# Patient Record
Sex: FEMALE | Race: WHITE | NOT HISPANIC OR LATINO | URBAN - NONMETROPOLITAN AREA
[De-identification: names, ages, dates, MRNs, and addresses within clinical notes are randomized per-mention and may not be internally consistent; named-entity substitution may affect disease eponyms.]

---

## 2020-06-09 ENCOUNTER — OFFICE VISIT (OUTPATIENT)
Dept: URBAN - NONMETROPOLITAN AREA CLINIC 2 | Facility: CLINIC | Age: 60
End: 2020-06-09

## 2021-08-05 ENCOUNTER — OUT OF OFFICE VISIT (OUTPATIENT)
Dept: URBAN - METROPOLITAN AREA MEDICAL CENTER 1 | Facility: MEDICAL CENTER | Age: 61
End: 2021-08-05
Payer: MEDICARE

## 2021-08-05 DIAGNOSIS — D50.9 ANEMIA, IRON DEFICIENCY: ICD-10-CM

## 2021-08-05 DIAGNOSIS — Z86.010 H/O ADENOMATOUS POLYP OF COLON: ICD-10-CM

## 2021-08-05 DIAGNOSIS — K21.9 ACID REFLUX: ICD-10-CM

## 2021-08-05 PROCEDURE — 99254 IP/OBS CNSLTJ NEW/EST MOD 60: CPT | Performed by: INTERNAL MEDICINE

## 2021-08-06 ENCOUNTER — OUT OF OFFICE VISIT (OUTPATIENT)
Dept: URBAN - METROPOLITAN AREA MEDICAL CENTER 1 | Facility: MEDICAL CENTER | Age: 61
End: 2021-08-06
Payer: MEDICARE

## 2021-08-06 DIAGNOSIS — D50.9 ANEMIA, IRON DEFICIENCY: ICD-10-CM

## 2021-08-06 DIAGNOSIS — K29.60 ADENOPAPILLOMATOSIS GASTRICA: ICD-10-CM

## 2021-08-06 DIAGNOSIS — K22.8 COLUMNAR-LINED ESOPHAGUS: ICD-10-CM

## 2021-08-06 PROCEDURE — 43239 EGD BIOPSY SINGLE/MULTIPLE: CPT | Performed by: INTERNAL MEDICINE

## 2021-11-09 ENCOUNTER — WEB ENCOUNTER (OUTPATIENT)
Dept: URBAN - NONMETROPOLITAN AREA CLINIC 2 | Facility: CLINIC | Age: 61
End: 2021-11-09

## 2021-11-09 ENCOUNTER — OFFICE VISIT (OUTPATIENT)
Dept: URBAN - NONMETROPOLITAN AREA CLINIC 2 | Facility: CLINIC | Age: 61
End: 2021-11-09
Payer: MEDICARE

## 2021-11-09 VITALS
HEIGHT: 67 IN | SYSTOLIC BLOOD PRESSURE: 136 MMHG | DIASTOLIC BLOOD PRESSURE: 87 MMHG | BODY MASS INDEX: 31.08 KG/M2 | WEIGHT: 198 LBS | TEMPERATURE: 96.7 F | HEART RATE: 108 BPM

## 2021-11-09 DIAGNOSIS — D50.8 OTHER IRON DEFICIENCY ANEMIAS: ICD-10-CM

## 2021-11-09 DIAGNOSIS — K25.3 ACUTE CAMERON ULCER: ICD-10-CM

## 2021-11-09 DIAGNOSIS — Z86.010 PERSONAL HISTORY OF COLONIC POLYPS: ICD-10-CM

## 2021-11-09 DIAGNOSIS — K57.30 ACQUIRED DIVERTICULOSIS OF COLON: ICD-10-CM

## 2021-11-09 DIAGNOSIS — R13.19 DYSPHAGIA: ICD-10-CM

## 2021-11-09 DIAGNOSIS — K21.00 CHRONIC REFLUX ESOPHAGITIS: ICD-10-CM

## 2021-11-09 DIAGNOSIS — K44.9 HIATAL HERNIA: ICD-10-CM

## 2021-11-09 PROBLEM — 397881000: Status: ACTIVE | Noted: 2021-11-09

## 2021-11-09 PROCEDURE — 99214 OFFICE O/P EST MOD 30 MIN: CPT | Performed by: NURSE PRACTITIONER

## 2021-11-09 RX ORDER — PANTOPRAZOLE SODIUM 40 MG/1
1 TABLET TABLET, DELAYED RELEASE ORAL
Qty: 180 TABLET | Refills: 3 | OUTPATIENT
Start: 2021-11-09

## 2021-11-09 RX ORDER — DULOXETINE 20 MG/1
TAKE 1 CAPSULE BY ORAL ROUTE 2 TIMES A DAY CAPSULE, DELAYED RELEASE PELLETS ORAL 2
Qty: 0 | Refills: 0 | Status: ACTIVE | COMMUNITY
Start: 1900-01-01

## 2021-11-09 RX ORDER — TURMERIC 400 MG
CAPSULE ORAL
Qty: 0 | Refills: 0 | Status: ACTIVE | COMMUNITY
Start: 1900-01-01

## 2021-11-09 RX ORDER — PANTOPRAZOLE SODIUM 40 MG/1
TAKE 1 TABLET BY MOUTH TWICE A DAY TABLET, DELAYED RELEASE ORAL
Qty: 180 | Refills: 4 | Status: ACTIVE | COMMUNITY
Start: 2020-05-11

## 2021-11-09 NOTE — HPI-TODAY'S VISIT:
Mrs. Gar is a 61-year-old female who presents for [facility] follow-up.  She was admitted in August per her primary care physician with symptomatic anemia and a hemoglobin of 5.7 she underwent upper endoscopy by Dr. Gordon with a large hiatal hernia patchy erythema of the lower cervical third of the esophagus along with Glenn's erosions.  This was likely the etiology of her bleeding.  She has been off PPI regularly.  She took Protonix 40 mg twice daily on discharge.  Her hemoglobin was 7.1 on discharge.  We do not have Dr. Wells off his recent records but she states that she had almost normalized with a recent mild drop.  She denies any melena or rectal bleeding.  Her last colonoscopy was in 2019 with a benign polyp and left-sided diverticular disease.  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  She does agree to restart twice daily PPI and to repeat her hemoglobin in 6 to 12 weeks.  She agrees to consider endoscopic evaluation if her hemoglobin does not improve back on PPI.  MB

## 2022-01-24 ENCOUNTER — LAB OUTSIDE AN ENCOUNTER (OUTPATIENT)
Dept: URBAN - NONMETROPOLITAN AREA CLINIC 2 | Facility: CLINIC | Age: 62
End: 2022-01-24

## 2022-02-02 ENCOUNTER — OFFICE VISIT (OUTPATIENT)
Dept: URBAN - METROPOLITAN AREA TELEHEALTH 2 | Facility: TELEHEALTH | Age: 62
End: 2022-02-02

## 2023-06-23 ENCOUNTER — WEB ENCOUNTER (OUTPATIENT)
Dept: URBAN - NONMETROPOLITAN AREA CLINIC 2 | Facility: CLINIC | Age: 63
End: 2023-06-23

## 2023-06-23 ENCOUNTER — LAB OUTSIDE AN ENCOUNTER (OUTPATIENT)
Dept: URBAN - NONMETROPOLITAN AREA CLINIC 2 | Facility: CLINIC | Age: 63
End: 2023-06-23

## 2023-06-23 ENCOUNTER — OFFICE VISIT (OUTPATIENT)
Dept: URBAN - NONMETROPOLITAN AREA CLINIC 2 | Facility: CLINIC | Age: 63
End: 2023-06-23
Payer: MEDICARE

## 2023-06-23 VITALS
TEMPERATURE: 98.4 F | BODY MASS INDEX: 35.31 KG/M2 | WEIGHT: 225 LBS | SYSTOLIC BLOOD PRESSURE: 169 MMHG | HEIGHT: 67 IN | HEART RATE: 73 BPM | DIASTOLIC BLOOD PRESSURE: 90 MMHG

## 2023-06-23 DIAGNOSIS — K57.90 DIVERTICULOSIS: ICD-10-CM

## 2023-06-23 DIAGNOSIS — D50.0 IRON DEFICIENCY ANEMIA DUE TO CHRONIC BLOOD LOSS: ICD-10-CM

## 2023-06-23 DIAGNOSIS — R13.10 DYSPHAGIA: ICD-10-CM

## 2023-06-23 DIAGNOSIS — K21.9 CHRONIC GERD: ICD-10-CM

## 2023-06-23 PROBLEM — 724556004: Status: ACTIVE | Noted: 2021-11-09

## 2023-06-23 PROCEDURE — 99214 OFFICE O/P EST MOD 30 MIN: CPT | Performed by: INTERNAL MEDICINE

## 2023-06-23 RX ORDER — TURMERIC 400 MG
CAPSULE ORAL
Qty: 0 | Refills: 0 | Status: ACTIVE | COMMUNITY
Start: 1900-01-01

## 2023-06-23 RX ORDER — DULOXETINE 20 MG/1
TAKE 1 CAPSULE BY ORAL ROUTE 2 TIMES A DAY CAPSULE, DELAYED RELEASE PELLETS ORAL 2
Qty: 0 | Refills: 0 | Status: ACTIVE | COMMUNITY
Start: 1900-01-01

## 2023-06-23 RX ORDER — PANTOPRAZOLE SODIUM 40 MG/1
TAKE 1 TABLET BY MOUTH TWICE A DAY TABLET, DELAYED RELEASE ORAL
Qty: 180 | Refills: 4 | Status: ACTIVE | COMMUNITY
Start: 2020-05-11

## 2023-06-23 RX ORDER — PANTOPRAZOLE SODIUM 40 MG/1
1 TABLET TABLET, DELAYED RELEASE ORAL
Qty: 180 TABLET | Refills: 3 | Status: ACTIVE | COMMUNITY
Start: 2021-11-09

## 2023-06-23 NOTE — HPI-TODAY'S VISIT:
Mrs. Gar is a 61-year-old female who presents for [facility] follow-up.  She was admitted in August per her primary care physician with symptomatic anemia and a hemoglobin of 5.7 she underwent upper endoscopy by Dr. Gordon with a large hiatal hernia patchy erythema of the lower cervical third of the esophagus along with Glnen's erosions.  This was likely the etiology of her bleeding.  She has been off PPI regularly.  She took Protonix 40 mg twice daily on discharge.  Her hemoglobin was 7.1 on discharge.  We do not have Dr. Wells off his recent records but she states that she had almost normalized with a recent mild drop.  She denies any melena or rectal bleeding.  Her last colonoscopy was in 2019 with a benign polyp and left-sided diverticular disease.  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  She does agree to restart twice daily PPI and to repeat her hemoglobin in 6 to 12 weeks.  She agrees to consider endoscopic evaluation if her hemoglobin does not improve back on PPI.  MB 6/23/2023 Misty presents for follow-up of reflux, dysphagia, iron deficiency anemia likely secondary to GI bleeding secondary to Glenn's erosions, and personal history of colon polyps.  She is on pantoprazole 40 mg twice daily.  Her reflux is stable but she is having increased dysphagia to solids.  Her last EGD was in 2021 with a large hiatal hernia and Glenn's erosions status post dilation in the past with improvement.  She does feel like she would benefit from a repeat EGD with dilation.  Her last colonoscopy was in 2019 with 1 tubular adenoma.  She is due for repeat next year.  Her iron deficiency has resolved.  This was likely secondary to chronic GI blood loss in the setting of a large hiatal hernia and Glenn's erosions.  She has been dismissed from hematology.  Her H&H has been stable with Dr. Galindo.  Today she is doing well however she is having increased dysphagia and she agrees to repeat EGD with dilation.  MB

## 2023-09-25 ENCOUNTER — OUT OF OFFICE VISIT (OUTPATIENT)
Dept: URBAN - NONMETROPOLITAN AREA SURGERY CENTER 1 | Facility: SURGERY CENTER | Age: 63
End: 2023-09-25
Payer: MEDICARE

## 2023-09-25 DIAGNOSIS — K29.60 ADENOPAPILLOMATOSIS GASTRICA: ICD-10-CM

## 2023-09-25 DIAGNOSIS — K44.9 HIATAL HERNIA: ICD-10-CM

## 2023-09-25 DIAGNOSIS — K22.2 SCHATZKI'S RING: ICD-10-CM

## 2023-09-25 DIAGNOSIS — K22.2 ACQUIRED ESOPHAGEAL RING: ICD-10-CM

## 2023-09-25 DIAGNOSIS — K20.80 ESOPHAGITIS DISSECANS SUPERFICIALIS: ICD-10-CM

## 2023-09-25 DIAGNOSIS — K31.9 ERYTHEMA OF GASTRIC ANTRUM: ICD-10-CM

## 2023-09-25 PROCEDURE — G8907 PT DOC NO EVENTS ON DISCHARG: HCPCS | Performed by: INTERNAL MEDICINE

## 2023-09-25 PROCEDURE — 43239 EGD BIOPSY SINGLE/MULTIPLE: CPT | Performed by: INTERNAL MEDICINE

## 2023-09-25 PROCEDURE — 00731 ANES UPR GI NDSC PX NOS: CPT | Performed by: NURSE ANESTHETIST, CERTIFIED REGISTERED

## 2023-09-25 PROCEDURE — 43249 ESOPH EGD DILATION <30 MM: CPT | Performed by: INTERNAL MEDICINE

## 2023-11-27 ENCOUNTER — DASHBOARD ENCOUNTERS (OUTPATIENT)
Age: 63
End: 2023-11-27

## 2023-11-27 ENCOUNTER — CLAIMS CREATED FROM THE CLAIM WINDOW (OUTPATIENT)
Dept: URBAN - NONMETROPOLITAN AREA CLINIC 2 | Facility: CLINIC | Age: 63
End: 2023-11-27
Payer: MEDICARE

## 2023-11-27 VITALS
WEIGHT: 220 LBS | DIASTOLIC BLOOD PRESSURE: 84 MMHG | SYSTOLIC BLOOD PRESSURE: 123 MMHG | HEART RATE: 88 BPM | TEMPERATURE: 98.7 F | HEIGHT: 67 IN | BODY MASS INDEX: 34.53 KG/M2

## 2023-11-27 DIAGNOSIS — R13.10 DYSPHAGIA: ICD-10-CM

## 2023-11-27 DIAGNOSIS — K25.3 ACUTE CAMERON ULCER: ICD-10-CM

## 2023-11-27 DIAGNOSIS — K22.2 SCHATZKI'S RING: ICD-10-CM

## 2023-11-27 DIAGNOSIS — D50.0 IRON DEFICIENCY ANEMIA DUE TO CHRONIC BLOOD LOSS: ICD-10-CM

## 2023-11-27 DIAGNOSIS — R13.19 DYSPHAGIA: ICD-10-CM

## 2023-11-27 DIAGNOSIS — K21.9 CHRONIC GERD: ICD-10-CM

## 2023-11-27 DIAGNOSIS — K57.90 DIVERTICULOSIS: ICD-10-CM

## 2023-11-27 DIAGNOSIS — Z86.010 PERSONAL HISTORY OF COLONIC POLYPS: ICD-10-CM

## 2023-11-27 DIAGNOSIS — K44.9 HIATAL HERNIA: ICD-10-CM

## 2023-11-27 PROBLEM — 235595009: Status: ACTIVE | Noted: 2023-06-23

## 2023-11-27 PROBLEM — 397825006: Status: ACTIVE | Noted: 2021-11-09

## 2023-11-27 PROCEDURE — 99214 OFFICE O/P EST MOD 30 MIN: CPT | Performed by: NURSE PRACTITIONER

## 2023-11-27 RX ORDER — DULOXETINE 30 MG/1
1 CAPSULE CAPSULE, DELAYED RELEASE PELLETS ORAL 2
Refills: 0 | Status: ACTIVE | COMMUNITY
Start: 1900-01-01

## 2023-11-27 RX ORDER — PANTOPRAZOLE SODIUM 40 MG/1
TAKE 1 TABLET BY MOUTH TWICE A DAY TABLET, DELAYED RELEASE ORAL
Qty: 180 | Refills: 4 | Status: ACTIVE | COMMUNITY
Start: 2020-05-11

## 2023-11-27 RX ORDER — TURMERIC 400 MG
CAPSULE ORAL
Qty: 0 | Refills: 0 | Status: ON HOLD | COMMUNITY
Start: 1900-01-01

## 2023-11-27 RX ORDER — ATORVASTATIN CALCIUM 20 MG/1
1 TABLET TABLET, FILM COATED ORAL ONCE A DAY
Status: ACTIVE | COMMUNITY

## 2023-11-27 RX ORDER — PANTOPRAZOLE SODIUM 40 MG/1
1 TABLET TABLET, DELAYED RELEASE ORAL
Qty: 180 TABLET | Refills: 3 | Status: ON HOLD | COMMUNITY
Start: 2021-11-09

## 2023-11-27 NOTE — PHYSICAL EXAM PSYCH:
normal mood with appropriate affect , speech clear , normal mood with appropriate affect , speech clear

## 2023-11-27 NOTE — PHYSICAL EXAM CHEST:
breathing is unlabored without accessory muscle use. , normal breath sounds. , breathing is unlabored without accessory muscle use. , normal breath sounds.

## 2023-11-27 NOTE — PHYSICAL EXAM GASTROINTESTINAL
Abdomen , soft, nontender, nondistended , normal bowel sounds , Abdomen , soft, nontender, nondistended , normal bowel sounds

## 2023-11-27 NOTE — PHYSICAL EXAM NEUROLOGIC:
oriented to person, place and time ,  , cranial nerves 2-12 grossly intact , oriented to person, place and time ,  , cranial nerves 2-12 grossly intact

## 2023-11-27 NOTE — PHYSICAL EXAM CARDIOVASCULAR:
regular rate and rhythm , no murmurs, rubs, gallops , regular rate and rhythm , no murmurs, rubs, gallops

## 2023-11-27 NOTE — HPI-TODAY'S VISIT:
Mrs. Gar is a 61-year-old female who presents for [facility] follow-up.  She was admitted in August per her primary care physician with symptomatic anemia and a hemoglobin of 5.7 she underwent upper endoscopy by Dr. Gordon with a large hiatal hernia patchy erythema of the lower cervical third of the esophagus along with Glenn's erosions.  This was likely the etiology of her bleeding.  She has been off PPI regularly.  She took Protonix 40 mg twice daily on discharge.  Her hemoglobin was 7.1 on discharge.  We do not have Dr. Wells off his recent records but she states that she had almost normalized with a recent mild drop.  She denies any melena or rectal bleeding.  Her last colonoscopy was in 2019 with a benign polyp and left-sided diverticular disease.  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  She does agree to restart twice daily PPI and to repeat her hemoglobin in 6 to 12 weeks.  She agrees to consider endoscopic evaluation if her hemoglobin does not improve back on PPI.  MB 6/23/2023 Misty presents for follow-up of reflux, dysphagia, iron deficiency anemia likely secondary to GI bleeding secondary to Glenn's erosions, and personal history of colon polyps.  She is on pantoprazole 40 mg twice daily.  Her reflux is stable but she is having increased dysphagia to solids.  Her last EGD was in 2021 with a large hiatal hernia and Glenn's erosions status post dilation in the past with improvement.  She does feel like she would benefit from a repeat EGD with dilation.  Her last colonoscopy was in 2019 with 1 tubular adenoma.  She is due for repeat next year.  Her iron deficiency has resolved.  This was likely secondary to chronic GI blood loss in the setting of a large hiatal hernia and Glenn's erosions.  She has been dismissed from hematology.  Her H&H has been stable with Dr. Galindo.  Today she is doing well however she is having increased dysphagia and she agrees to repeat EGD with dilation.  MB 11/27/2023 Mrs. Gar presents for follow-up of dysphagia.  She is doing much better status post dilation of her Schatzki's ring to 18 mm.  She still has occasional dysphagia to solids.  She agrees to wean her PPI to 20 mg twice daily.  She is due for collected cancer screening which is scheduled in January.  Today overall she is doing much better. MB

## 2023-12-22 ENCOUNTER — TELEPHONE ENCOUNTER (OUTPATIENT)
Dept: URBAN - NONMETROPOLITAN AREA CLINIC 2 | Facility: CLINIC | Age: 63
End: 2023-12-22

## 2024-02-28 ENCOUNTER — DAC (OUTPATIENT)
Dept: URBAN - NONMETROPOLITAN AREA SURGERY CENTER 1 | Facility: SURGERY CENTER | Age: 64
End: 2024-02-28

## 2024-05-28 ENCOUNTER — CLAIMS CREATED FROM THE CLAIM WINDOW (OUTPATIENT)
Dept: URBAN - NONMETROPOLITAN AREA CLINIC 2 | Facility: CLINIC | Age: 64
End: 2024-05-28

## 2024-05-28 ENCOUNTER — OFFICE VISIT (OUTPATIENT)
Dept: URBAN - NONMETROPOLITAN AREA CLINIC 2 | Facility: CLINIC | Age: 64
End: 2024-05-28
Payer: MEDICARE

## 2024-05-28 VITALS
HEIGHT: 67 IN | BODY MASS INDEX: 34.53 KG/M2 | HEART RATE: 96 BPM | WEIGHT: 220 LBS | DIASTOLIC BLOOD PRESSURE: 73 MMHG | SYSTOLIC BLOOD PRESSURE: 108 MMHG

## 2024-05-28 DIAGNOSIS — R53.83 FATIGUE, UNSPECIFIED TYPE: ICD-10-CM

## 2024-05-28 DIAGNOSIS — K21.9 CHRONIC GERD: ICD-10-CM

## 2024-05-28 DIAGNOSIS — K44.9 HIATAL HERNIA: ICD-10-CM

## 2024-05-28 DIAGNOSIS — D50.0 IRON DEFICIENCY ANEMIA DUE TO CHRONIC BLOOD LOSS: ICD-10-CM

## 2024-05-28 PROBLEM — 84229001: Status: ACTIVE | Noted: 2024-05-28

## 2024-05-28 PROCEDURE — 99214 OFFICE O/P EST MOD 30 MIN: CPT | Performed by: NURSE PRACTITIONER

## 2024-05-28 RX ORDER — ATORVASTATIN CALCIUM 20 MG/1
1 TABLET TABLET, FILM COATED ORAL ONCE A DAY
Status: ACTIVE | COMMUNITY

## 2024-05-28 RX ORDER — DULOXETINE 30 MG/1
1 CAPSULE CAPSULE, DELAYED RELEASE PELLETS ORAL 2
Refills: 0 | Status: ACTIVE | COMMUNITY
Start: 1900-01-01

## 2024-05-28 RX ORDER — PANTOPRAZOLE SODIUM 40 MG/1
TAKE 1 TABLET BY MOUTH TWICE A DAY TABLET, DELAYED RELEASE ORAL
Qty: 180 | Refills: 4 | Status: ACTIVE | COMMUNITY
Start: 2020-05-11

## 2024-05-28 RX ORDER — TURMERIC 400 MG
CAPSULE ORAL
Qty: 0 | Refills: 0 | Status: ON HOLD | COMMUNITY
Start: 1900-01-01

## 2024-05-29 ENCOUNTER — LAB OUTSIDE AN ENCOUNTER (OUTPATIENT)
Dept: URBAN - NONMETROPOLITAN AREA CLINIC 2 | Facility: CLINIC | Age: 64
End: 2024-05-29

## 2024-05-29 ENCOUNTER — TELEPHONE ENCOUNTER (OUTPATIENT)
Dept: URBAN - NONMETROPOLITAN AREA CLINIC 2 | Facility: CLINIC | Age: 64
End: 2024-05-29

## 2024-05-29 ENCOUNTER — TELEPHONE ENCOUNTER (OUTPATIENT)
Dept: URBAN - METROPOLITAN AREA CLINIC 63 | Facility: CLINIC | Age: 64
End: 2024-05-29

## 2024-05-29 LAB
A/G RATIO: 1.3
ABSOLUTE BASOPHILS: 117
ABSOLUTE EOSINOPHILS: 18
ABSOLUTE LYMPHOCYTES: 1548
ABSOLUTE MONOCYTES: 972
ABSOLUTE NEUTROPHILS: 6345
ALBUMIN: 4.3
ALKALINE PHOSPHATASE: 74
ALT (SGPT): 8
AST (SGOT): 14
BASOPHILS: 1.3
BILIRUBIN, TOTAL: 0.6
BUN/CREATININE RATIO: (no result)
BUN: 19
CALCIUM: 9.3
CARBON DIOXIDE, TOTAL: 21
CHLORIDE: 101
CREATININE: 1.05
EGFR: 59
EOSINOPHILS: 0.2
FERRITIN, SERUM: 3
FOLATE, SERUM: 5.6
GLOBULIN, TOTAL: 3.2
GLUCOSE: 59
HEMATOCRIT: 27.3
HEMOGLOBIN: 7.3
IRON BIND.CAP.(TIBC): 443
IRON SATURATION: 4
IRON: 18
LYMPHOCYTES: 17.2
MCH: 20.3
MCHC: 26.7
MCV: 76
MONOCYTES: 10.8
MPV: 11
NEUTROPHILS: 70.5
PLATELET COUNT: 376
POTASSIUM: 5
PROTEIN, TOTAL: 7.5
RDW: 16.6
RED BLOOD CELL COUNT: 3.59
SODIUM: 138
TSH W/REFLEX TO FT4: 2.83
VITAMIN B12: 316
WHITE BLOOD CELL COUNT: 9

## 2024-05-29 RX ORDER — SUCRALFATE 1 G/1
1 TABLET ON AN EMPTY STOMACH TABLET ORAL TWICE A DAY
Qty: 180 | OUTPATIENT
Start: 2024-05-29 | End: 2024-08-27

## 2024-05-29 RX ORDER — PANTOPRAZOLE SODIUM 40 MG/1
1 TABLET TABLET, DELAYED RELEASE ORAL TWICE DAILY
Qty: 180 TABLET | Refills: 3
Start: 2020-05-11

## 2024-06-11 ENCOUNTER — CLAIMS CREATED FROM THE CLAIM WINDOW (OUTPATIENT)
Dept: URBAN - METROPOLITAN AREA CLINIC 4 | Facility: CLINIC | Age: 64
End: 2024-06-11
Payer: MEDICARE

## 2024-06-11 ENCOUNTER — OFFICE VISIT (OUTPATIENT)
Dept: URBAN - NONMETROPOLITAN AREA SURGERY CENTER 1 | Facility: SURGERY CENTER | Age: 64
End: 2024-06-11
Payer: MEDICARE

## 2024-06-11 DIAGNOSIS — K22.89 OTHER SPECIFIED DISEASE OF ESOPHAGUS: ICD-10-CM

## 2024-06-11 DIAGNOSIS — K44.9 HIATAL HERNIA: ICD-10-CM

## 2024-06-11 DIAGNOSIS — K31.89 OTHER DISEASES OF STOMACH AND DUODENUM: ICD-10-CM

## 2024-06-11 DIAGNOSIS — D50.9 ANEMIA: ICD-10-CM

## 2024-06-11 DIAGNOSIS — D50.9 IRON DEFICIENCY ANEMIA: ICD-10-CM

## 2024-06-11 DIAGNOSIS — K29.60 OTHER GASTRITIS WITHOUT BLEEDING: ICD-10-CM

## 2024-06-11 DIAGNOSIS — K29.70 GASTRITIS, UNSPECIFIED, WITHOUT BLEEDING: ICD-10-CM

## 2024-06-11 DIAGNOSIS — K20.90 ESOPHAGITIS: ICD-10-CM

## 2024-06-11 PROCEDURE — 43239 EGD BIOPSY SINGLE/MULTIPLE: CPT | Performed by: INTERNAL MEDICINE

## 2024-06-11 PROCEDURE — 43239 EGD BIOPSY SINGLE/MULTIPLE: CPT | Performed by: CLINIC/CENTER

## 2024-06-11 PROCEDURE — 88305 TISSUE EXAM BY PATHOLOGIST: CPT | Performed by: PATHOLOGY

## 2024-06-11 PROCEDURE — 00731 ANES UPR GI NDSC PX NOS: CPT | Performed by: NURSE ANESTHETIST, CERTIFIED REGISTERED

## 2024-06-11 PROCEDURE — 88312 SPECIAL STAINS GROUP 1: CPT | Performed by: PATHOLOGY

## 2024-06-11 RX ORDER — SUCRALFATE 1 G/1
1 TABLET ON AN EMPTY STOMACH TABLET ORAL TWICE A DAY
Qty: 180 | Status: ACTIVE | COMMUNITY
Start: 2024-05-29 | End: 2024-08-27

## 2024-06-11 RX ORDER — PANTOPRAZOLE SODIUM 40 MG/1
1 TABLET TABLET, DELAYED RELEASE ORAL TWICE DAILY
Qty: 180 TABLET | Refills: 3 | Status: ACTIVE | COMMUNITY
Start: 2020-05-11

## 2024-06-11 RX ORDER — ATORVASTATIN CALCIUM 20 MG/1
1 TABLET TABLET, FILM COATED ORAL ONCE A DAY
Status: ACTIVE | COMMUNITY

## 2024-06-11 RX ORDER — DULOXETINE 30 MG/1
1 CAPSULE CAPSULE, DELAYED RELEASE PELLETS ORAL 2
Refills: 0 | Status: ACTIVE | COMMUNITY
Start: 1900-01-01

## 2024-06-11 RX ORDER — TURMERIC 400 MG
CAPSULE ORAL
Qty: 0 | Refills: 0 | Status: ON HOLD | COMMUNITY
Start: 1900-01-01

## 2024-11-11 ENCOUNTER — LAB OUTSIDE AN ENCOUNTER (OUTPATIENT)
Dept: URBAN - NONMETROPOLITAN AREA CLINIC 2 | Facility: CLINIC | Age: 64
End: 2024-11-11

## 2024-11-11 ENCOUNTER — OFFICE VISIT (OUTPATIENT)
Dept: URBAN - NONMETROPOLITAN AREA CLINIC 2 | Facility: CLINIC | Age: 64
End: 2024-11-11
Payer: MEDICARE

## 2024-11-11 VITALS
SYSTOLIC BLOOD PRESSURE: 127 MMHG | WEIGHT: 220 LBS | BODY MASS INDEX: 34.53 KG/M2 | HEART RATE: 86 BPM | DIASTOLIC BLOOD PRESSURE: 84 MMHG | HEIGHT: 67 IN

## 2024-11-11 DIAGNOSIS — K44.9 HIATAL HERNIA: ICD-10-CM

## 2024-11-11 DIAGNOSIS — D50.0 IRON DEFICIENCY ANEMIA DUE TO CHRONIC BLOOD LOSS: ICD-10-CM

## 2024-11-11 DIAGNOSIS — Z86.0101 PERSONAL HISTORY OF ADENOMATOUS AND SERRATED COLON POLYPS: ICD-10-CM

## 2024-11-11 DIAGNOSIS — K22.2 SCHATZKI'S RING: ICD-10-CM

## 2024-11-11 DIAGNOSIS — K57.90 DIVERTICULOSIS: ICD-10-CM

## 2024-11-11 DIAGNOSIS — K21.9 CHRONIC GERD: ICD-10-CM

## 2024-11-11 DIAGNOSIS — K25.3 ACUTE CAMERON ULCER: ICD-10-CM

## 2024-11-11 PROBLEM — 428283002: Status: ACTIVE | Noted: 2024-11-11

## 2024-11-11 PROCEDURE — 99214 OFFICE O/P EST MOD 30 MIN: CPT | Performed by: NURSE PRACTITIONER

## 2024-11-11 RX ORDER — ATORVASTATIN CALCIUM 20 MG/1
1 TABLET TABLET, FILM COATED ORAL ONCE A DAY
Status: ACTIVE | COMMUNITY

## 2024-11-11 RX ORDER — FERROUS SULFATE 325(65) MG
1 TABLET TABLET ORAL
Status: ACTIVE | COMMUNITY

## 2024-11-11 RX ORDER — DULOXETINE 30 MG/1
1 CAPSULE CAPSULE, DELAYED RELEASE PELLETS ORAL 2
Refills: 0 | Status: ACTIVE | COMMUNITY
Start: 1900-01-01

## 2024-11-11 RX ORDER — SUCRALFATE 1 G/1
1 TABLET ON AN EMPTY STOMACH TABLET ORAL TWICE A DAY
Status: ACTIVE | COMMUNITY

## 2024-11-11 RX ORDER — PREGABALIN 75 MG/1
1 CAPSULE CAPSULE ORAL ONCE A DAY
Status: ACTIVE | COMMUNITY

## 2024-11-11 RX ORDER — TURMERIC ROOT EXTRACT 500 MG
AS DIRECTED TABLET ORAL
Refills: 0 | Status: ACTIVE | COMMUNITY
Start: 1900-01-01

## 2024-11-11 RX ORDER — PANTOPRAZOLE SODIUM 40 MG/1
1 TABLET TABLET, DELAYED RELEASE ORAL TWICE DAILY
Qty: 180 TABLET | Refills: 3 | Status: ACTIVE | COMMUNITY
Start: 2020-05-11

## 2024-12-06 ENCOUNTER — TELEPHONE ENCOUNTER (OUTPATIENT)
Dept: URBAN - NONMETROPOLITAN AREA CLINIC 2 | Facility: CLINIC | Age: 64
End: 2024-12-06

## 2025-03-10 ENCOUNTER — OFFICE VISIT (OUTPATIENT)
Dept: URBAN - NONMETROPOLITAN AREA CLINIC 2 | Facility: CLINIC | Age: 65
End: 2025-03-10
Payer: MEDICARE

## 2025-03-10 VITALS
WEIGHT: 235 LBS | SYSTOLIC BLOOD PRESSURE: 137 MMHG | BODY MASS INDEX: 36.88 KG/M2 | DIASTOLIC BLOOD PRESSURE: 83 MMHG | HEART RATE: 87 BPM | HEIGHT: 67 IN

## 2025-03-10 DIAGNOSIS — Z86.0101 PERSONAL HISTORY OF ADENOMATOUS AND SERRATED COLON POLYPS: ICD-10-CM

## 2025-03-10 DIAGNOSIS — K22.2 SCHATZKI'S RING: ICD-10-CM

## 2025-03-10 DIAGNOSIS — R13.10 DYSPHAGIA: ICD-10-CM

## 2025-03-10 DIAGNOSIS — K57.90 DIVERTICULOSIS: ICD-10-CM

## 2025-03-10 DIAGNOSIS — K21.9 CHRONIC GERD: ICD-10-CM

## 2025-03-10 DIAGNOSIS — K44.9 HIATAL HERNIA: ICD-10-CM

## 2025-03-10 DIAGNOSIS — K25.3 ACUTE CAMERON ULCER: ICD-10-CM

## 2025-03-10 DIAGNOSIS — D50.0 IRON DEFICIENCY ANEMIA DUE TO CHRONIC BLOOD LOSS: ICD-10-CM

## 2025-03-10 PROCEDURE — 99214 OFFICE O/P EST MOD 30 MIN: CPT | Performed by: NURSE PRACTITIONER

## 2025-03-10 RX ORDER — SUCRALFATE 1 G/1
1 TABLET ON AN EMPTY STOMACH TABLET ORAL TWICE A DAY
Status: ACTIVE | COMMUNITY

## 2025-03-10 RX ORDER — FERROUS SULFATE 325(65) MG
1 TABLET TABLET ORAL
Status: ACTIVE | COMMUNITY

## 2025-03-10 RX ORDER — PREGABALIN 75 MG/1
1 CAPSULE CAPSULE ORAL ONCE A DAY
Status: ACTIVE | COMMUNITY

## 2025-03-10 RX ORDER — PANTOPRAZOLE SODIUM 40 MG/1
1 TABLET TABLET, DELAYED RELEASE ORAL TWICE DAILY
Qty: 180 TABLET | Refills: 3 | Status: ACTIVE | COMMUNITY
Start: 2020-05-11

## 2025-03-10 RX ORDER — ATORVASTATIN CALCIUM 20 MG/1
1 TABLET TABLET, FILM COATED ORAL ONCE A DAY
Status: ACTIVE | COMMUNITY

## 2025-03-10 RX ORDER — TURMERIC ROOT EXTRACT 500 MG
AS DIRECTED TABLET ORAL
Refills: 0 | Status: ACTIVE | COMMUNITY
Start: 1900-01-01

## 2025-03-10 RX ORDER — DULOXETINE 30 MG/1
1 CAPSULE CAPSULE, DELAYED RELEASE PELLETS ORAL 2
Refills: 0 | Status: ACTIVE | COMMUNITY
Start: 1900-01-01

## 2025-03-10 NOTE — HPI-TODAY'S VISIT:
Mrs. Gar is a 61-year-old female who presents for [facility] follow-up.  She was admitted in August per her primary care physician with symptomatic anemia and a hemoglobin of 5.7 she underwent upper endoscopy by Dr. Gordon with a large hiatal hernia patchy erythema of the lower cervical third of the esophagus along with Glenn's erosions.  This was likely the etiology of her bleeding.  She has been off PPI regularly.  She took Protonix 40 mg twice daily on discharge.  Her hemoglobin was 7.1 on discharge.  We do not have Dr. Wells off his recent records but she states that she had almost normalized with a recent mild drop.  She denies any melena or rectal bleeding.  Her last colonoscopy was in 2019 with a benign polyp and left-sided diverticular disease.  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  She does agree to restart twice daily PPI and to repeat her hemoglobin in 6 to 12 weeks.  She agrees to consider endoscopic evaluation if her hemoglobin does not improve back on PPI.  MB 6/23/2023 Misty presents for follow-up of reflux, dysphagia, iron deficiency anemia likely secondary to GI bleeding secondary to Glenn's erosions, and personal history of colon polyps.  She is on pantoprazole 40 mg twice daily.  Her reflux is stable but she is having increased dysphagia to solids.  Her last EGD was in 2021 with a large hiatal hernia and Glenn's erosions status post dilation in the past with improvement.  She does feel like she would benefit from a repeat EGD with dilation.  Her last colonoscopy was in 2019 with 1 tubular adenoma.  She is due for repeat next year.  Her iron deficiency has resolved.  This was likely secondary to chronic GI blood loss in the setting of a large hiatal hernia and Glenn's erosions.  She has been dismissed from hematology.  Her H&H has been stable with Dr. Galindo.  Today she is doing well however she is having increased dysphagia and she agrees to repeat EGD with dilation.  MB 11/27/2023 Mrs. Gar presents for follow-up of dysphagia.  She is doing much better status post dilation of her Schatzki's ring to 18 mm.  She still has occasional dysphagia to solids.  She agrees to wean her PPI to 20 mg twice daily.  She is due for collected cancer screening which is scheduled in January.  Today overall she is doing much better. MB 5/28/2024 The patient presents for follow-up of TING.  Since her last visit she has had debilitating fatigue.  She has not been able to complete a shower due to standing.  She is due for labs next week with her PCP.  She denies any signs or symptoms of GI bleeding.  She is weaned her pantoprazole to 40 mg daily.  Today she agrees to repeat labs.  If she is anemic consider repeat EGD, twice daily PPI, hematology referral, and consider surgery workup.  If her labs are normal we will wean her PPI and she will follow-up with her PCP.  MB 11/11/2024 Misty presents for follow-up.  Her EGD shows a 6 cm hiatal hernia and Glenn's erosions.  She is back on pantoprazole 40 mg twice daily.  She did meet with hematology, she status post PRBC and 4 iron infusions.  She is only taking Carafate once a day.  Today she is feeling much better.  Her hemoglobin has normal normalized after IV iron and PRBC.  Will schedule CT enterography to rule out small bowel disease.  She wants to avoid surgery for Nissen.  MB 3/10/2025 Beata presents for follow-up of iron deficiency anemia.  Her CTE shows a large hiatal hernia otherwise no significant findings.  She is on pantoprazole 40 mg twice daily.  Her blood counts have normalized, she denies any GI complaints.  She denies any dysphagia or other issues.  Today she is doing well otherwise with no new GI complaints.  MB

## 2025-03-11 LAB
A/G RATIO: 1.5
ABSOLUTE BASOPHILS: 88
ABSOLUTE EOSINOPHILS: 248
ABSOLUTE LYMPHOCYTES: 1800
ABSOLUTE MONOCYTES: 696
ABSOLUTE NEUTROPHILS: 5168
ALBUMIN: 4.2
ALKALINE PHOSPHATASE: 77
ALT (SGPT): 13
AST (SGOT): 16
BASOPHILS: 1.1
BILIRUBIN, TOTAL: 0.5
BUN/CREATININE RATIO: (no result)
BUN: 12
CALCIUM: 9.5
CARBON DIOXIDE, TOTAL: 25
CHLORIDE: 102
CREATININE: 0.81
EGFR: 80
EOSINOPHILS: 3.1
FERRITIN, SERUM: 21
GLOBULIN, TOTAL: 2.8
GLUCOSE: 110
HEMATOCRIT: 45.5
HEMOGLOBIN: 14.6
IRON BIND.CAP.(TIBC): 337
IRON SATURATION: 44
IRON: 149
LYMPHOCYTES: 22.5
MAGNESIUM: 2
MCH: 29
MCHC: 32.1
MCV: 90.3
MONOCYTES: 8.7
MPV: 12.1
NEUTROPHILS: 64.6
PLATELET COUNT: 264
POTASSIUM: 4.9
PROTEIN, TOTAL: 7
RDW: 13.6
RED BLOOD CELL COUNT: 5.04
SODIUM: 139
VITAMIN B12: 644
WHITE BLOOD CELL COUNT: 8

## 2025-07-15 ENCOUNTER — OFFICE VISIT (OUTPATIENT)
Dept: URBAN - NONMETROPOLITAN AREA CLINIC 2 | Facility: CLINIC | Age: 65
End: 2025-07-15